# Patient Record
Sex: MALE | Race: WHITE
[De-identification: names, ages, dates, MRNs, and addresses within clinical notes are randomized per-mention and may not be internally consistent; named-entity substitution may affect disease eponyms.]

---

## 2019-11-01 ENCOUNTER — HOSPITAL ENCOUNTER (EMERGENCY)
Dept: HOSPITAL 9 - MADERS | Age: 29
LOS: 1 days | Discharge: HOME | End: 2019-11-02
Payer: COMMERCIAL
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
## 2019-11-01 DIAGNOSIS — S39.012A: ICD-10-CM

## 2019-11-01 DIAGNOSIS — F17.210: ICD-10-CM

## 2019-11-01 DIAGNOSIS — F10.129: ICD-10-CM

## 2019-11-01 DIAGNOSIS — S20.212A: ICD-10-CM

## 2019-11-01 DIAGNOSIS — E11.9: ICD-10-CM

## 2019-11-01 DIAGNOSIS — S06.0X1A: Primary | ICD-10-CM

## 2019-11-01 DIAGNOSIS — Z79.4: ICD-10-CM

## 2019-11-01 LAB
ALBUMIN SERPL BCG-MCNC: 4.4 G/DL (ref 3.5–5)
ALP SERPL-CCNC: 83 U/L (ref 40–110)
ALT SERPL W P-5'-P-CCNC: 89 U/L (ref 8–55)
ANION GAP SERPL CALC-SCNC: 21 MMOL/L (ref 10–20)
AST SERPL-CCNC: 90 U/L (ref 5–34)
BILIRUB SERPL-MCNC: 0.4 MG/DL (ref 0.2–1.2)
BUN SERPL-MCNC: 10 MG/DL (ref 8.9–20.6)
CALCIUM SERPL-MCNC: 8.8 MG/DL (ref 7.8–10.44)
CHLORIDE SERPL-SCNC: 101 MMOL/L (ref 98–107)
CO2 SERPL-SCNC: 20 MMOL/L (ref 22–29)
CREAT CL PREDICTED SERPL C-G-VRATE: 0 ML/MIN (ref 70–130)
GLOBULIN SER CALC-MCNC: 3 G/DL (ref 2.4–3.5)
GLUCOSE SERPL-MCNC: 293 MG/DL (ref 70–105)
HGB BLD-MCNC: 13.9 G/DL (ref 14–18)
LIPASE SERPL-CCNC: 18 U/L (ref 8–78)
MCH RBC QN AUTO: 29.5 PG (ref 27–31)
MCV RBC AUTO: 88.9 FL (ref 78–98)
MDIFF COMPLETE?: YES
PLATELET # BLD AUTO: 355 THOU/UL (ref 130–400)
POTASSIUM SERPL-SCNC: 3.9 MMOL/L (ref 3.5–5.1)
RBC # BLD AUTO: 4.7 MILL/UL (ref 4.7–6.1)
SODIUM SERPL-SCNC: 138 MMOL/L (ref 136–145)
WBC # BLD AUTO: 20 THOU/UL (ref 4.8–10.8)

## 2019-11-01 PROCEDURE — 70450 CT HEAD/BRAIN W/O DYE: CPT

## 2019-11-01 PROCEDURE — 96361 HYDRATE IV INFUSION ADD-ON: CPT

## 2019-11-01 PROCEDURE — 72125 CT NECK SPINE W/O DYE: CPT

## 2019-11-01 PROCEDURE — 96374 THER/PROPH/DIAG INJ IV PUSH: CPT

## 2019-11-01 PROCEDURE — 85025 COMPLETE CBC W/AUTO DIFF WBC: CPT

## 2019-11-01 PROCEDURE — 83690 ASSAY OF LIPASE: CPT

## 2019-11-01 PROCEDURE — 71260 CT THORAX DX C+: CPT

## 2019-11-01 PROCEDURE — 74177 CT ABD & PELVIS W/CONTRAST: CPT

## 2019-11-01 PROCEDURE — 80053 COMPREHEN METABOLIC PANEL: CPT

## 2019-11-01 NOTE — CT
CT Cervical Spine WO Con



HISTORY: Fall with neck pain.



COMPARISON: None.



FINDINGS: The vertebral bodies maintain normal height. Disc spaces appear well preserved and the face
ts are in normal alignment. There is no evidence of canal or foraminal stenosis. The lungs are

clear of any infiltrative process. There is no CT evidence of fracture.



IMPRESSION: No CT evidence of fracture the cervical spine.



Reported By: Pedro Luis Rodriguez 

Electronically Signed:  11/1/2019 10:40 PM

## 2019-11-01 NOTE — CT
CT Brain WO Con



HISTORY: Head injury post fall.



COMPARISON: None.



FINDINGS: The ventricular and cisternal system is within normal limits. There are no signs of intrace
rebral hemorrhage or extra-axial fluid collections. The mastoid air cells and visualized sinuses

are clear.



IMPRESSION: No acute intracranial abnormalities.



Reported By: Pedro Luis Rodriguez 

Electronically Signed:  11/1/2019 10:17 PM

## 2019-11-01 NOTE — CT
CT Chest Abd Pelvis W Con



HISTORY: Fall with right-sided pain.



COMPARISON: None.



FINDINGS: There are subsegmental atelectatic changes within the lung bases. No pleural effusions. No 
pneumothorax. No rib fractures are identified.



Thoracic aorta is normal in caliber. No mediastinal or hilar adenopathy.



CT of abdomen performed with contrast: The liver, spleen, pancreas and gallbladder regions all appear
 unremarkable.



Right and left adrenal glands are normal in size. Hypodensity seen involving the right kidney is most
 likely a cyst. Kidneys are otherwise unremarkable. No free fluid or signs for bowel wall injury.



CT of pelvis performed with contrast: There is no evidence of adenopathy, mass or free fluid. The pel
johnnie ring is intact without evidence of fracture.



CT of thoracic spine: No acute injury.



CT of lumbar spine: No acute injury.



IMPRESSION: No acute findings of the chest abdomen or pelvis.



Reported By: Pedro Luis Rodriguez 

Electronically Signed:  11/1/2019 10:43 PM